# Patient Record
Sex: FEMALE | Race: WHITE | NOT HISPANIC OR LATINO | Employment: FULL TIME | RURAL
[De-identification: names, ages, dates, MRNs, and addresses within clinical notes are randomized per-mention and may not be internally consistent; named-entity substitution may affect disease eponyms.]

---

## 2023-03-06 ENCOUNTER — OFFICE VISIT (OUTPATIENT)
Dept: FAMILY MEDICINE CLINIC | Facility: CLINIC | Age: 28
End: 2023-03-06

## 2023-03-06 ENCOUNTER — PATIENT ROUNDING (BHMG ONLY) (OUTPATIENT)
Dept: FAMILY MEDICINE CLINIC | Facility: CLINIC | Age: 28
End: 2023-03-06

## 2023-03-06 VITALS
WEIGHT: 214.4 LBS | HEIGHT: 69 IN | DIASTOLIC BLOOD PRESSURE: 70 MMHG | RESPIRATION RATE: 18 BRPM | SYSTOLIC BLOOD PRESSURE: 116 MMHG | OXYGEN SATURATION: 99 % | HEART RATE: 73 BPM | BODY MASS INDEX: 31.76 KG/M2

## 2023-03-06 DIAGNOSIS — Z02.89 ENCOUNTER FOR PHYSICAL EXAMINATION RELATED TO EMPLOYMENT: Primary | ICD-10-CM

## 2023-03-06 PROCEDURE — 99213 OFFICE O/P EST LOW 20 MIN: CPT | Performed by: FAMILY MEDICINE

## 2023-03-06 RX ORDER — ESCITALOPRAM OXALATE 10 MG/1
TABLET ORAL
COMMUNITY
Start: 2023-01-16

## 2023-03-06 NOTE — PROGRESS NOTES
March 6, 2023    Hello, may I speak with Karen Melot?    My name is Raina     I am  with [facility]  [address] [facility] [address] [phone].    Before we get started may I verify your date of birth? 1995    I am calling to officially welcome you to our practice and ask about your recent visit. Is this a good time to talk?Yes    Tell me about your visit with us. What things went well?  Every thing went very well with this visit today no complaints.      We're always looking for ways to make our patients' experiences even better. Do you have recommendations on ways we may improve? No recommendations.     Overall were you satisfied with your first visit to our practice? Yes.       I appreciate you taking the time to speak with me today. Is there anything else I can do for you? No.    Thank you, and have a great day.       Patient/Caregiver provided printed discharge information.

## 2023-03-06 NOTE — PROGRESS NOTES
Subjective   Karen Sandhu is a 28 y.o. female presenting with chief complaint of:   Chief Complaint   Patient presents with   • Annual Exam     Patient states that she is here today for annual exam.        History of Present Illness :  Alone. Needs exam to work at [place] [address].  No personal history or exposure to international travel, HIV, hepatitis, TB, or Zika, staph infections, open wounds, chronic cough.     Has few chronic problems to consider that might have a bearing on today's issues; not an interval appointment.       Chronic/acute problems reviewed today:   1. Encounter for physical examination related to employment      Has an/another acute issue today: none (not discussed today).    The following portions of the patient's history were reviewed and updated as appropriate: allergies, current medications, past family history, past medical history, past social history, past surgical history and problem list.      Current Outpatient Medications:   •  escitalopram (LEXAPRO) 10 MG tablet, , Disp: , Rfl:     No problems with medications.    No Known Allergies    ROS:   No fever now/recent  No rash/wounds  No cough/wheeze  No diarrhea/abdominal pain/rectal bleeding  No neck/back/joint pain of significance (that interferes with daily work/job description or work at home)  No anxiety/depression to allow her to hurt herself/others    Lab Results:  No results found for this or any previous visit.    A1C:No results for input(s): HGBA1C in the last 66421 hours.  PSA:No results for input(s): PSA in the last 32577 hours.  CBC:No results for input(s): WBC, HGB, HCT, PLT, IRONSERUM, IRON in the last 45820 hours.   BMP/CMP:No results for input(s): NA, K, CL, CO2, GLU, BUN, CREATININE, EGFRIFNONA, EGFRIFAFRI, CALCIUM in the last 44158 hours.  HEPATIC:No results for input(s): ALT, AST, ALKPHOS, TOTAL in the last 50925 hours.  THYROID:No results for input(s): TSH, T3FREE, FTI in the last 80898 hours.    Invalid  "input(s): T4FREE, T3, T4, TEUP,  TOTALT4    Objective   /70 (BP Location: Left arm, Patient Position: Sitting, Cuff Size: Adult)   Pulse 73   Resp 18   Ht 175.3 cm (69\")   Wt 97.3 kg (214 lb 6.4 oz)   SpO2 99%   BMI 31.66 kg/m²   Body mass index is 31.66 kg/m².    Physical Exam  Vitals and nursing note reviewed.   Constitutional:       General: She is not in acute distress.     Appearance: She is obese.   HENT:      Head: Normocephalic.      Right Ear: Tympanic membrane, ear canal and external ear normal.      Left Ear: Tympanic membrane, ear canal and external ear normal.      Nose: Nose normal.      Mouth/Throat:      Mouth: Mucous membranes are moist.      Pharynx: Oropharynx is clear.   Eyes:      Extraocular Movements: Extraocular movements intact.      Conjunctiva/sclera: Conjunctivae normal.      Pupils: Pupils are equal, round, and reactive to light.   Cardiovascular:      Rate and Rhythm: Normal rate and regular rhythm.      Heart sounds: Normal heart sounds.   Pulmonary:      Effort: Pulmonary effort is normal.      Breath sounds: Normal breath sounds.   Abdominal:      Palpations: Abdomen is soft. There is no mass.      Tenderness: There is no abdominal tenderness.   Musculoskeletal:         General: No swelling, tenderness, deformity or signs of injury.      Cervical back: Neck supple. No tenderness.      Right lower leg: No edema.      Left lower leg: No edema.   Lymphadenopathy:      Cervical: No cervical adenopathy.   Skin:     General: Skin is warm and dry.      Findings: No bruising, lesion or rash.   Neurological:      Mental Status: She is alert and oriented to person, place, and time.   Psychiatric:         Mood and Affect: Mood normal.         Behavior: Behavior normal.         Thought Content: Thought content normal.         Judgment: Judgment normal.       Assessment & Plan     1. Encounter for physical examination related to employment      Acceptable health and history for work " activities as described.  Patient to report any significant change in health to health to supervisor.     Follow up:   Copy/scan employee form.   Original to patient  No return expected  [phone]